# Patient Record
Sex: MALE | Race: WHITE | NOT HISPANIC OR LATINO | Employment: PART TIME | URBAN - METROPOLITAN AREA
[De-identification: names, ages, dates, MRNs, and addresses within clinical notes are randomized per-mention and may not be internally consistent; named-entity substitution may affect disease eponyms.]

---

## 2021-12-22 ENCOUNTER — HOSPITAL ENCOUNTER (EMERGENCY)
Facility: HOSPITAL | Age: 41
Discharge: HOME | End: 2021-12-22
Attending: EMERGENCY MEDICINE

## 2021-12-22 VITALS
RESPIRATION RATE: 14 BRPM | HEART RATE: 99 BPM | TEMPERATURE: 98 F | OXYGEN SATURATION: 96 % | SYSTOLIC BLOOD PRESSURE: 169 MMHG | DIASTOLIC BLOOD PRESSURE: 95 MMHG

## 2021-12-22 DIAGNOSIS — T50.901A DRUG OVERDOSE: ICD-10-CM

## 2021-12-22 DIAGNOSIS — T40.1X1A HEROIN OVERDOSE, ACCIDENTAL OR UNINTENTIONAL, INITIAL ENCOUNTER: Primary | ICD-10-CM

## 2021-12-22 PROCEDURE — 99284 EMERGENCY DEPT VISIT MOD MDM: CPT | Mod: ,,, | Performed by: EMERGENCY MEDICINE

## 2021-12-22 PROCEDURE — 99284 PR EMERGENCY DEPT VISIT,LEVEL IV: ICD-10-PCS | Mod: ,,, | Performed by: EMERGENCY MEDICINE

## 2021-12-22 PROCEDURE — 99283 EMERGENCY DEPT VISIT LOW MDM: CPT | Mod: 25

## 2021-12-22 RX ORDER — NALOXONE HYDROCHLORIDE 4 MG/.1ML
SPRAY NASAL
Qty: 1 EACH | Refills: 11 | Status: SHIPPED | OUTPATIENT
Start: 2021-12-22

## 2022-07-07 ENCOUNTER — HOSPITAL ENCOUNTER (EMERGENCY)
Facility: HOSPITAL | Age: 42
Discharge: HOME | End: 2022-07-07
Attending: EMERGENCY MEDICINE

## 2022-07-07 VITALS
SYSTOLIC BLOOD PRESSURE: 139 MMHG | WEIGHT: 220 LBS | HEIGHT: 70 IN | OXYGEN SATURATION: 98 % | RESPIRATION RATE: 18 BRPM | TEMPERATURE: 98 F | BODY MASS INDEX: 31.5 KG/M2 | DIASTOLIC BLOOD PRESSURE: 92 MMHG | HEART RATE: 77 BPM

## 2022-07-07 DIAGNOSIS — S61.411A LACERATION OF RIGHT HAND WITHOUT FOREIGN BODY, INITIAL ENCOUNTER: Primary | ICD-10-CM

## 2022-07-07 PROCEDURE — 12002 RPR S/N/AX/GEN/TRNK2.6-7.5CM: CPT | Mod: F7

## 2022-07-07 PROCEDURE — 99284 EMERGENCY DEPT VISIT MOD MDM: CPT | Mod: 25

## 2022-07-07 PROCEDURE — 90715 TDAP VACCINE 7 YRS/> IM: CPT | Performed by: EMERGENCY MEDICINE

## 2022-07-07 PROCEDURE — 99284 PR EMERGENCY DEPT VISIT,LEVEL IV: ICD-10-PCS | Mod: 25,,, | Performed by: EMERGENCY MEDICINE

## 2022-07-07 PROCEDURE — 63600175 PHARM REV CODE 636 W HCPCS: Performed by: EMERGENCY MEDICINE

## 2022-07-07 PROCEDURE — 25000003 PHARM REV CODE 250: Performed by: EMERGENCY MEDICINE

## 2022-07-07 PROCEDURE — 90471 IMMUNIZATION ADMIN: CPT | Performed by: EMERGENCY MEDICINE

## 2022-07-07 PROCEDURE — 12002 PR RESUP NPTERF WND BODY 2.6-7.5 CM: ICD-10-PCS | Mod: ,,, | Performed by: EMERGENCY MEDICINE

## 2022-07-07 PROCEDURE — 99284 EMERGENCY DEPT VISIT MOD MDM: CPT | Mod: 25,,, | Performed by: EMERGENCY MEDICINE

## 2022-07-07 PROCEDURE — 12002 RPR S/N/AX/GEN/TRNK2.6-7.5CM: CPT | Mod: ,,, | Performed by: EMERGENCY MEDICINE

## 2022-07-07 RX ORDER — SULFAMETHOXAZOLE AND TRIMETHOPRIM 800; 160 MG/1; MG/1
1 TABLET ORAL 2 TIMES DAILY
Qty: 14 TABLET | Refills: 0 | Status: SHIPPED | OUTPATIENT
Start: 2022-07-07 | End: 2022-07-14

## 2022-07-07 RX ORDER — GABAPENTIN 800 MG/1
800 TABLET ORAL 3 TIMES DAILY
COMMUNITY

## 2022-07-07 RX ORDER — OXYCODONE AND ACETAMINOPHEN 5; 325 MG/1; MG/1
1 TABLET ORAL
Status: COMPLETED | OUTPATIENT
Start: 2022-07-07 | End: 2022-07-07

## 2022-07-07 RX ORDER — SULFAMETHOXAZOLE AND TRIMETHOPRIM 800; 160 MG/1; MG/1
1 TABLET ORAL
Status: COMPLETED | OUTPATIENT
Start: 2022-07-07 | End: 2022-07-07

## 2022-07-07 RX ORDER — CEPHALEXIN 500 MG/1
500 CAPSULE ORAL EVERY 6 HOURS
COMMUNITY

## 2022-07-07 RX ADMIN — OXYCODONE HYDROCHLORIDE AND ACETAMINOPHEN 1 TABLET: 5; 325 TABLET ORAL at 02:07

## 2022-07-07 RX ADMIN — TETANUS TOXOID, REDUCED DIPHTHERIA TOXOID AND ACELLULAR PERTUSSIS VACCINE, ADSORBED 0.5 ML: 5; 2.5; 8; 8; 2.5 SUSPENSION INTRAMUSCULAR at 02:07

## 2022-07-07 RX ADMIN — SULFAMETHOXAZOLE AND TRIMETHOPRIM 1 TABLET: 800; 160 TABLET ORAL at 02:07

## 2022-07-07 NOTE — DISCHARGE INSTRUCTIONS
Keep hand clean and dry. Wash with soap and water only   Dress wound with the yellow guaze + white gauze then wrap in blue bandage   Return with any worsening symptoms or concerns for infection   Stitches should be removed in 7-10 days

## 2022-07-07 NOTE — FIRST PROVIDER EVALUATION
"Medical screening exam completed.  I have conducted a focused provider triage encounter, findings are as follows:    Brief history of present illness:  Lack to right middle finger after falling through floor    Vitals:    07/07/22 1250 07/07/22 1251   BP:  (!) 139/92   Pulse: 77    Resp: 19    Temp: 98.2 °F (36.8 °C)    TempSrc: Oral    SpO2: 98%    Weight: 99.8 kg (220 lb)    Height: 5' 10" (1.778 m)        Pertinent physical exam:  Hand bandaged and bleeding controlled    Brief workup plan:  To intake for further eval    Preliminary workup initiated; this workup will be continued and followed by the physician or advanced practice provider that is assigned to the patient when roomed.  "

## 2022-07-07 NOTE — ED NOTES
Patient identifiers verified and correct for Mr Whitley  C/C: Laceration right hand 3rd finger SEE NN  APPEARANCE: awake and alert in NAD.  SKIN: warm, dry  3 cm semicircular avolusion to right hand near mid joint middle finger  MUSCULOSKELETAL: Patient moving all extremities spontaneously, no obvious swelling or deformities noted. Ambulates independently.  RESPIRATORY: Denies shortness of breath.Respirations unlabored.   CARDIAC: Denies CP, 2+ distal pulses; no peripheral edema  ABDOMEN: S/ND/NT, Denies nausea  : voids spontaneously, denies difficulty  Neurologic: AAO x 4; follows commands equal strength in all extremities; denies numbness/tingling. Denies dizziness  Denies weakness

## 2022-07-07 NOTE — ED NOTES
Patient states he fell with laceration noted to right hand middle finger at 1230, Currently on Keflex for tooth issue

## 2022-07-07 NOTE — ED PROVIDER NOTES
Encounter Date: 7/7/2022    SCRIBE #1 NOTE: I, Sharlene Kuhn, am scribing for, and in the presence of,  Rafa Callahan MD. I have scribed the following portions of the note - Other sections scribed: HPI, ROS, PE.       History     Chief Complaint   Patient presents with    Hand Injury     Pt reports to ED w/ complaints of right middle finger lac noted pt reports falling through jess      Time patient was seen by the provider: 2:05 PM      The patient is a 41 y.o. male with no significant past medical history who presents to the ED with a complaint of right middle finger laceration onset today. Patient reports falling through the floor at the time of the incident and notes he possibly cut himself with the ceramic tile. Notes immense bleeding to the injured site. There are no other exacerbating or alleviating factors.    The history is provided by the patient and medical records. No  was used.     Review of patient's allergies indicates:  No Known Allergies  Past Medical History:   Diagnosis Date    Reflux      Past Surgical History:   Procedure Laterality Date    OTHER SURGICAL HISTORY Left      Family History   Problem Relation Age of Onset    Heart disease Father     Hyperlipidemia Father     Hypertension Father     Stroke Father     Cancer Maternal Grandmother     Thyroid disease Maternal Grandmother      Social History     Tobacco Use    Smoking status: Current Every Day Smoker     Packs/day: 1.50     Types: Cigarettes    Smokeless tobacco: Never Used   Substance Use Topics    Alcohol use: Not Currently     Comment: 6 a day, none x 1 week    Drug use: No     Review of Systems   Constitutional: Negative for fever.   HENT: Negative for sore throat.    Respiratory: Negative for shortness of breath.    Cardiovascular: Negative for chest pain.   Gastrointestinal: Negative for nausea.   Genitourinary: Negative for dysuria.   Musculoskeletal: Positive for arthralgias. Negative  for back pain.   Skin: Positive for wound. Negative for rash.   Neurological: Negative for weakness.   Hematological: Does not bruise/bleed easily.       Physical Exam     Initial Vitals   BP Pulse Resp Temp SpO2   07/07/22 1251 07/07/22 1250 07/07/22 1250 07/07/22 1250 07/07/22 1250   (!) 139/92 77 19 98.2 °F (36.8 °C) 98 %      MAP       --                Physical Exam    Nursing note and vitals reviewed.  Constitutional: Vital signs are normal. He appears well-developed and well-nourished.   HENT:   Head: Normocephalic and atraumatic.   Mouth/Throat: Oropharynx is clear and moist.   Eyes: Conjunctivae and EOM are normal. Pupils are equal, round, and reactive to light.   Neck: Trachea normal. Neck supple.   Normal range of motion.  Cardiovascular: Normal rate, regular rhythm, normal heart sounds and normal pulses.   Pulmonary/Chest: No respiratory distress.   Abdominal: He exhibits no distension. There is no abdominal tenderness.   Musculoskeletal:         General: Normal range of motion.      Right hand: Laceration and tenderness present.      Cervical back: Normal range of motion and neck supple.      Comments: Right hand long finger, palmar side with 3cm laceration   Normal ROM in all joints of finger, no arterial bleeding noted, mild oozing. No foreign body.       Neurological: He is alert and oriented to person, place, and time.   Skin: Skin is warm and dry.         ED Course   Lac Repair    Date/Time: 7/7/2022 4:00 PM  Performed by: Rafa Callahan MD  Authorized by: Rafa Callahan MD     Consent:     Consent obtained:  Verbal    Consent given by:  Patient    Risks discussed:  Infection, pain and retained foreign body  Anesthesia:     Anesthesia method:  Nerve block    Block location:  Finger    Block needle gauge:  24 G    Block anesthetic:  Lidocaine 1% WITH epi    Block outcome:  Anesthesia achieved  Laceration details:     Location:  Finger    Finger location:  R long finger    Length (cm):   3  Pre-procedure details:     Preparation:  Patient was prepped and draped in usual sterile fashion and imaging obtained to evaluate for foreign bodies  Exploration:     Hemostasis achieved with:  Tourniquet    Imaging obtained: x-ray      Imaging outcome: foreign body not noted      Wound exploration: wound explored through full range of motion and entire depth of wound visualized    Treatment:     Area cleansed with:  Povidone-iodine and saline    Amount of cleaning:  Extensive    Irrigation solution:  Sterile saline    Debridement:  None    Undermining:  None    Scar revision: no    Skin repair:     Repair method:  Sutures    Suture size:  4-0    Suture material:  Prolene    Suture technique:  Simple interrupted    Number of sutures:  5  Approximation:     Approximation:  Close  Repair type:     Repair type:  Simple  Post-procedure details:     Dressing:  Non-adherent dressing    Procedure completion:  Tolerated well, no immediate complications      Labs Reviewed - No data to display       Imaging Results          X-Ray Hand 3 View Right (Final result)  Result time 07/07/22 15:23:55   Procedure changed from X-Ray Hand 3 view Left     Final result by Albin Hoyos MD (07/07/22 15:23:55)                 Impression:      See above      Electronically signed by: Albin Hoyos MD  Date:    07/07/2022  Time:    15:23             Narrative:    EXAMINATION:  XR HAND COMPLETE 3 VIEW RIGHT    CLINICAL HISTORY:  laceration;    TECHNIQUE:  PA, lateral, and oblique views of the right hand were performed.    COMPARISON:  None    FINDINGS:  Bony structures of the hand are intact.  No fractures or foreign bodies are noted.                              X-Rays:   Independently Interpreted Readings:   Other Readings:  No foreign body or fracture present.    Medications   Tdap (BOOSTRIX) vaccine injection 0.5 mL (0.5 mLs Intramuscular Given 7/7/22 8231)   sulfamethoxazole-trimethoprim 800-160mg per tablet 1 tablet (1  tablet Oral Given 7/7/22 1444)   oxyCODONE-acetaminophen 5-325 mg per tablet 1 tablet (1 tablet Oral Given 7/7/22 1444)     Medical Decision Making:   History:   Old Medical Records: I decided to obtain old medical records.  Initial Assessment:   Evaluation of hand injury  Differential Diagnosis:   Laceration, fracture, ligamentous injury   Clinical Tests:   Radiological Study: Ordered and Reviewed  ED Management:  Laceration noted. Imaging negative for FB. Exam does not indication ligamentous// tendon injury. tdap updated. abx given. Laceration repaired without complication. Will discharge with abx. Provided wound care supplies and return precautions.           Scribe Attestation:   Scribe #1: I performed the above scribed service and the documentation accurately describes the services I performed. I attest to the accuracy of the note.                 Clinical Impression:   Final diagnoses:  [S61.411A] Laceration of right hand without foreign body, initial encounter (Primary)          ED Disposition Condition    Discharge Stable        ED Prescriptions     Medication Sig Dispense Start Date End Date Auth. Provider    sulfamethoxazole-trimethoprim 800-160mg (BACTRIM DS) 800-160 mg Tab Take 1 tablet by mouth 2 (two) times daily. for 7 days 14 tablet 7/7/2022 7/14/2022 Rafa Callahan MD        Follow-up Information     Follow up With Specialties Details Why Contact Info    [facility] Emergency Medicine  If symptoms worsen [id number]  [facility] [phone]           Rafa Callahan MD  07/09/22 0809